# Patient Record
Sex: FEMALE | Race: WHITE
[De-identification: names, ages, dates, MRNs, and addresses within clinical notes are randomized per-mention and may not be internally consistent; named-entity substitution may affect disease eponyms.]

---

## 2017-05-03 ENCOUNTER — HOSPITAL ENCOUNTER (OUTPATIENT)
Dept: HOSPITAL 45 - C.LAB1850 | Age: 62
Discharge: HOME | End: 2017-05-03
Attending: PHYSICIAN ASSISTANT
Payer: COMMERCIAL

## 2017-05-03 DIAGNOSIS — E05.00: Primary | ICD-10-CM

## 2017-05-03 LAB — TSH SERPL-ACNC: 2.22 UIU/ML (ref 0.3–4.5)

## 2017-08-04 ENCOUNTER — HOSPITAL ENCOUNTER (OUTPATIENT)
Dept: HOSPITAL 45 - C.LAB1850 | Age: 62
Discharge: HOME | End: 2017-08-04
Attending: PHYSICIAN ASSISTANT
Payer: COMMERCIAL

## 2017-08-04 DIAGNOSIS — E05.90: Primary | ICD-10-CM

## 2017-08-04 LAB — TSH SERPL-ACNC: 3.22 UIU/ML (ref 0.3–4.5)

## 2017-09-15 ENCOUNTER — HOSPITAL ENCOUNTER (OUTPATIENT)
Dept: HOSPITAL 45 - C.LAB1850 | Age: 62
Discharge: HOME | End: 2017-09-15
Attending: PHYSICIAN ASSISTANT
Payer: COMMERCIAL

## 2017-09-15 DIAGNOSIS — E05.90: Primary | ICD-10-CM

## 2017-09-15 LAB — TSH SERPL-ACNC: 2.39 UIU/ML (ref 0.3–4.5)

## 2017-09-21 ENCOUNTER — HOSPITAL ENCOUNTER (OUTPATIENT)
Dept: HOSPITAL 45 - C.ULTRBC | Age: 62
Discharge: HOME | End: 2017-09-21
Attending: PHYSICIAN ASSISTANT
Payer: COMMERCIAL

## 2017-09-21 DIAGNOSIS — M79.89: Primary | ICD-10-CM

## 2017-09-21 NOTE — DIAGNOSTIC IMAGING REPORT
RIGHT LOWER EXTREMITY VENOUS DOPPLER



CLINICAL HISTORY: Right leg pain and swelling    



COMPARISON STUDY:  No previous studies for comparison. 



TECHNIQUE:  Sonography of the deep venous system of the right lower extremity

was performed. Compression and augmentation were evaluated.



FINDINGS:  The right common femoral, superficial femoral and popliteal veins

were compressible. Augmentation was normal. Flow was shown within the deep calf

vessels. Sonography of the posterior right calf revealed no abnormality.



IMPRESSION: No evidence of deep venous thrombus within the right lower

extremity.







Electronically signed by:  Terence Low M.D.

9/21/2017 11:56 AM



Dictated Date/Time:  9/21/2017 11:56 AM

## 2017-11-06 ENCOUNTER — HOSPITAL ENCOUNTER (OUTPATIENT)
Dept: HOSPITAL 45 - C.LAB1850 | Age: 62
Discharge: HOME | End: 2017-11-06
Attending: PHYSICIAN ASSISTANT
Payer: COMMERCIAL

## 2017-11-06 DIAGNOSIS — E03.9: Primary | ICD-10-CM

## 2017-11-06 LAB — TSH SERPL-ACNC: 3.39 UIU/ML (ref 0.3–4.5)

## 2017-12-18 ENCOUNTER — HOSPITAL ENCOUNTER (OUTPATIENT)
Dept: HOSPITAL 45 - C.RAD1850 | Age: 62
Discharge: HOME | End: 2017-12-18
Attending: PHYSICIAN ASSISTANT
Payer: COMMERCIAL

## 2017-12-18 DIAGNOSIS — R05: Primary | ICD-10-CM

## 2017-12-18 NOTE — DIAGNOSTIC IMAGING REPORT
CHEST 2 VIEWS ROUTINE



CLINICAL HISTORY: Cough.    



COMPARISON STUDY:  Chest radiograph March 28, 2014.



FINDINGS: Incidental note is made of suspected calcific tendinitis of the left

rotator cuff. The lung volumes are normal. No pneumothorax or pleural effusion

is present. No consolidation is identified. Pulmonary vascularity is normal.

Cardiomediastinal silhouette is normal. 



IMPRESSION:  No acute cardiopulmonary findings. 









Electronically signed by:  Terence Low M.D.

12/18/2017 4:06 PM



Dictated Date/Time:  12/18/2017 4:05 PM

## 2018-01-15 ENCOUNTER — HOSPITAL ENCOUNTER (OUTPATIENT)
Dept: HOSPITAL 45 - C.LAB1850 | Age: 63
Discharge: HOME | End: 2018-01-15
Attending: INTERNAL MEDICINE
Payer: COMMERCIAL

## 2018-01-15 DIAGNOSIS — E03.9: Primary | ICD-10-CM

## 2018-01-15 DIAGNOSIS — R35.0: ICD-10-CM

## 2018-01-15 LAB — HBA1C MFR BLD: 5.3 % (ref 4.5–5.6)

## 2018-02-19 ENCOUNTER — HOSPITAL ENCOUNTER (OUTPATIENT)
Dept: HOSPITAL 45 - C.LAB1850 | Age: 63
End: 2018-02-19
Attending: INTERNAL MEDICINE
Payer: COMMERCIAL

## 2018-02-19 DIAGNOSIS — E03.9: Primary | ICD-10-CM

## 2018-03-13 ENCOUNTER — HOSPITAL ENCOUNTER (OUTPATIENT)
Dept: HOSPITAL 45 - C.LAB | Age: 63
Discharge: HOME | End: 2018-03-13
Attending: INTERNAL MEDICINE
Payer: COMMERCIAL

## 2018-03-13 DIAGNOSIS — E03.9: Primary | ICD-10-CM

## 2018-03-23 ENCOUNTER — HOSPITAL ENCOUNTER (OUTPATIENT)
Dept: HOSPITAL 45 - C.LAB1850 | Age: 63
Discharge: HOME | End: 2018-03-23
Attending: INTERNAL MEDICINE
Payer: COMMERCIAL

## 2018-03-23 DIAGNOSIS — E05.00: Primary | ICD-10-CM

## 2018-04-27 ENCOUNTER — HOSPITAL ENCOUNTER (OUTPATIENT)
Dept: HOSPITAL 45 - C.LAB1850 | Age: 63
Discharge: HOME | End: 2018-04-27
Attending: INTERNAL MEDICINE
Payer: COMMERCIAL

## 2018-04-27 DIAGNOSIS — E03.9: Primary | ICD-10-CM
